# Patient Record
Sex: FEMALE | Race: WHITE | Employment: FULL TIME | URBAN - METROPOLITAN AREA
[De-identification: names, ages, dates, MRNs, and addresses within clinical notes are randomized per-mention and may not be internally consistent; named-entity substitution may affect disease eponyms.]

---

## 2024-11-07 ENCOUNTER — OFFICE VISIT (OUTPATIENT)
Age: 39
End: 2024-11-07
Payer: COMMERCIAL

## 2024-11-07 VITALS
WEIGHT: 240 LBS | SYSTOLIC BLOOD PRESSURE: 132 MMHG | BODY MASS INDEX: 38.57 KG/M2 | HEART RATE: 83 BPM | DIASTOLIC BLOOD PRESSURE: 90 MMHG | HEIGHT: 66 IN | OXYGEN SATURATION: 99 %

## 2024-11-07 DIAGNOSIS — R55 SYNCOPE AND COLLAPSE: Primary | ICD-10-CM

## 2024-11-07 DIAGNOSIS — R00.2 PALPITATIONS: ICD-10-CM

## 2024-11-07 PROCEDURE — 93000 ELECTROCARDIOGRAM COMPLETE: CPT | Performed by: INTERNAL MEDICINE

## 2024-11-07 PROCEDURE — 99204 OFFICE O/P NEW MOD 45 MIN: CPT | Performed by: INTERNAL MEDICINE

## 2024-11-07 RX ORDER — NORETHINDRONE AND ETHINYL ESTRADIOL 1 MG-35MCG
1 KIT ORAL DAILY
COMMUNITY
Start: 2024-08-25

## 2024-11-07 RX ORDER — ONDANSETRON 4 MG/1
4 TABLET, FILM COATED ORAL EVERY 8 HOURS PRN
COMMUNITY
Start: 2024-10-16

## 2024-11-07 ASSESSMENT — PATIENT HEALTH QUESTIONNAIRE - PHQ9
2. FEELING DOWN, DEPRESSED OR HOPELESS: NOT AT ALL
SUM OF ALL RESPONSES TO PHQ QUESTIONS 1-9: 0
SUM OF ALL RESPONSES TO PHQ9 QUESTIONS 1 & 2: 0
SUM OF ALL RESPONSES TO PHQ QUESTIONS 1-9: 0
1. LITTLE INTEREST OR PLEASURE IN DOING THINGS: NOT AT ALL

## 2024-11-07 NOTE — PROGRESS NOTES
Nick Gallegos presents today for   Chief Complaint   Patient presents with    New Patient     Est care referred by pcp due to syncope and collapse     Dizziness     Dizzy when sit and stand at times     Palpitations     Fluttering palps if feels like about to faint        Nick Gallegos preferred language for health care discussion is english/other.    Is someone accompanying this pt? no    Is the patient using any DME equipment during OV? no    Depression Screening:  Depression: Not at risk (11/7/2024)    PHQ-2     PHQ-2 Score: 0        Learning Assessment:  Who is the primary learner? Patient    What is the preferred language for health care of the primary learner? ENGLISH    How does the primary learner prefer to learn new concepts? DEMONSTRATION    Answered By patient    Relationship to Learner SELF           Pt currently taking Anticoagulant therapy? no    Pt currently taking Antiplatelet therapy ? no      Coordination of Care:  1. Have you been to the ER, urgent care clinic since your last visit? Hospitalized since your last visit? no    2. Have you seen or consulted any other health care providers outside of the [facility] since your last visit? Include any pap smears or colon screening. no    
on file   Stress: Not on file   Social Connections: Not on file   Intimate Partner Violence: Not on file   Housing Stability: Not on file    Her mother is my patient    Review of Systems    14 pt Review of Systems is negative unless otherwise mentioned in the HPI.    Wt Readings from Last 3 Encounters:   11/07/24 108.9 kg (240 lb)   08/19/22 117.4 kg (258 lb 12.8 oz)     Temp Readings from Last 3 Encounters:   No data found for Temp     BP Readings from Last 3 Encounters:   11/07/24 (!) 132/90   08/19/22 120/72     Pulse Readings from Last 3 Encounters:   11/07/24 83   08/19/22 72       Physical Exam:    BP (!) 132/90 (Site: Right Upper Arm, Position: Standing, Cuff Size: Large Adult)   Pulse 83   Ht 1.676 m (5' 6\")   Wt 108.9 kg (240 lb)   SpO2 99%   BMI 38.74 kg/m²    Physical Exam  Vitals and nursing note reviewed.   Constitutional:       General: She is not in acute distress.     Appearance: Normal appearance.   HENT:      Head: Normocephalic.      Nose: Nose normal.      Mouth/Throat:      Mouth: Mucous membranes are moist.   Eyes:      Extraocular Movements: Extraocular movements intact.      Pupils: Pupils are equal, round, and reactive to light.   Neck:      Vascular: No carotid bruit.   Cardiovascular:      Rate and Rhythm: Normal rate and regular rhythm.      Pulses: Normal pulses.      Heart sounds: No murmur heard.     No friction rub. No gallop.   Pulmonary:      Effort: Pulmonary effort is normal.      Breath sounds: Normal breath sounds. No wheezing or rales.   Abdominal:      Palpations: Abdomen is soft.   Musculoskeletal:      Cervical back: Neck supple.      Right lower leg: No edema.      Left lower leg: No edema.   Skin:     General: Skin is warm and dry.   Neurological:      General: No focal deficit present.      Mental Status: She is alert and oriented to person, place, and time.   Psychiatric:         Mood and Affect: Mood normal.         EKG NSR, normal ekg    Impression and

## 2024-11-07 NOTE — PATIENT INSTRUCTIONS
If symptoms return, please call [phone] (Dr. George Park's nurse) to make us aware.    Neurocardiogenic Syncope:  The most frequent mechanism for reflex syncope is a mixed hemodynamic response, although an individual patient may have syncopal events characterized principally by vasodepressor, cardioinhibitory, or mixed responses. The cardioinhibitory response results from increased parasympathetic activation and may be manifested by sinus bradycardia, WI interval prolongation, advanced atrioventricular block, and/or asystole. The vasodepressor response is due to decreased sympathetic activity and can lead to symptomatic hypotension even in the absence of severe bradycardia.     Education today included etiology and natural history of Neurocardiogenic/ Reflex syncope, and ways to avoid actually losing consciousness such as staying well hydrated and sitting/ or laying down when you feel \"premonition.\" Please see the handout the doctor gave you today to really focus on lifestyle changes you can make to avoid symptoms.